# Patient Record
Sex: MALE | Race: OTHER | NOT HISPANIC OR LATINO | URBAN - METROPOLITAN AREA
[De-identification: names, ages, dates, MRNs, and addresses within clinical notes are randomized per-mention and may not be internally consistent; named-entity substitution may affect disease eponyms.]

---

## 2017-04-02 ENCOUNTER — EMERGENCY (EMERGENCY)
Facility: HOSPITAL | Age: 47
LOS: 1 days | Discharge: HOME | End: 2017-04-02
Attending: EMERGENCY MEDICINE
Payer: COMMERCIAL

## 2017-04-02 VITALS
RESPIRATION RATE: 16 BRPM | DIASTOLIC BLOOD PRESSURE: 88 MMHG | SYSTOLIC BLOOD PRESSURE: 158 MMHG | HEIGHT: 66 IN | TEMPERATURE: 98 F | HEART RATE: 100 BPM | OXYGEN SATURATION: 100 % | WEIGHT: 199.96 LBS

## 2017-04-02 DIAGNOSIS — H92.01 OTALGIA, RIGHT EAR: ICD-10-CM

## 2017-04-02 PROCEDURE — 99282 EMERGENCY DEPT VISIT SF MDM: CPT

## 2017-04-02 PROCEDURE — 99283 EMERGENCY DEPT VISIT LOW MDM: CPT

## 2017-04-02 NOTE — ED PROVIDER NOTE - OBJECTIVE STATEMENT
47 yo M was cleaning his ears and now feels fullness and FB sensation to the R ear. Denies decreased hearing. 47 yo M was cleaning his ears and now feels fullness and FB sensation to the R ear. Denies decreased hearing. Thinks he pushed the ear wax back into the ear. Denies bleeding or fever.

## 2017-04-02 NOTE — ED PROVIDER NOTE - MEDICAL DECISION MAKING DETAILS
Patient with R ear fullness, on exam with cerumen impaction, attempt cerumen removal with peroxide and currette. Patient with R ear fullness, on exam with cerumen impaction, attempt cerumen removal with peroxide and currette. Feels better after removal.

## 2017-04-02 NOTE — ED ADULT NURSE NOTE - OBJECTIVE STATEMENT
STATES HE FEELS LIKE SOMETHING IS IN HIS RIGHT EAR AFTER CLEANING RIGHT EAR. RIGHT EAR IRRIGATED BY NATASHA JOVEL NOTED